# Patient Record
Sex: MALE | Race: WHITE | Employment: FULL TIME | URBAN - METROPOLITAN AREA
[De-identification: names, ages, dates, MRNs, and addresses within clinical notes are randomized per-mention and may not be internally consistent; named-entity substitution may affect disease eponyms.]

---

## 2022-09-28 ENCOUNTER — OFFICE VISIT (OUTPATIENT)
Dept: OCCUPATIONAL MEDICINE | Age: 57
End: 2022-09-28

## 2022-09-28 VITALS
DIASTOLIC BLOOD PRESSURE: 72 MMHG | RESPIRATION RATE: 16 BRPM | HEART RATE: 60 BPM | TEMPERATURE: 98.1 F | SYSTOLIC BLOOD PRESSURE: 138 MMHG | OXYGEN SATURATION: 98 %

## 2022-09-28 DIAGNOSIS — B35.4 TINEA CORPORIS: Primary | ICD-10-CM

## 2022-09-28 RX ORDER — NAFTIFINE HYDROCHLORIDE 20 MG/G
CREAM TOPICAL
Qty: 45 G | Refills: 1 | Status: SHIPPED | OUTPATIENT
Start: 2022-09-28

## 2022-09-28 ASSESSMENT — ENCOUNTER SYMPTOMS
COUGH: 0
RESPIRATORY NEGATIVE: 1
VOMITING: 0
SHORTNESS OF BREATH: 0
SORE THROAT: 0
RHINORRHEA: 0
DIARRHEA: 0

## 2022-09-28 NOTE — PROGRESS NOTES
Subjective:      Patient ID: Syed Vasquez is a 60 y.o. male. Rash  This is a new problem. Episode onset: His wife noticed it 3 days ago. The problem is unchanged. The affected locations include the back. The rash is characterized by dryness and redness. It is unknown (He has dogs at home) if there was an exposure to a precipitant. Pertinent negatives include no congestion, cough, diarrhea, fatigue, fever, joint pain, rhinorrhea, shortness of breath, sore throat or vomiting. Treatments tried: OTC fungal cream for 2 days, once a day. The treatment provided no relief. There is no history of allergies or asthma. Review of Systems   Constitutional:  Negative for chills, fatigue and fever. HENT: Negative. Negative for congestion, rhinorrhea and sore throat. Respiratory: Negative. Negative for cough and shortness of breath. Gastrointestinal:  Negative for diarrhea and vomiting. Genitourinary: Negative. Musculoskeletal:  Negative for joint pain. Skin:  Positive for rash. Red circular spot on back     No Known Allergies   No current outpatient medications on file prior to visit. No current facility-administered medications on file prior to visit. Objective:   Physical Exam  Vitals reviewed. Constitutional:       General: He is not in acute distress. Appearance: Normal appearance. He is not ill-appearing. Skin:     General: Skin is warm and dry. Neurological:      General: No focal deficit present. Mental Status: He is alert and oriented to person, place, and time. Psychiatric:         Mood and Affect: Mood normal.         Behavior: Behavior normal.         Thought Content: Thought content normal.         Judgment: Judgment normal.     Differed complete exam    Vitals:    09/28/22 0829   BP: 138/72   Pulse: 60   Resp: 16   Temp: 98.1 °F (36.7 °C)   SpO2: 98%        Assessment:          Diagnosis Orders   1.  Tinea corporis  Naftifine HCl 2 % CREA Plan:      P:    * Rxs provided: Apply cream daily for up to 2 weeks, once rash is resolved used for an additional 2-3 days as directed, (for up to 2 weeks)- if rash is not resolving after 2 weeks f/u in the clinic or PCP  Recommendations: Keep area clean and dry, warm showers, avoid hot showers, bath, hot tubs until completely resolved. Do not apply any other creams or lotions  Labs performed/ordered:  Educational information:  Educational material reviewed and provided at checkout re:_____  Follow up: Patient was encouraged to return to the clinic and/or PCP if s/s persist or do not resolve completely. Also advised to seek emergent care if worsening signs and symptoms warrant immediate evaluation including, but not limited to HA, blurred vision, speech disturbance, difficulty with ambulation/gait, numbness, tingling, weakness, syncope, abdominal pain, chest pain, or shortness of breath. *Side effects, adverse effects, risks versus benefits associated with medications prescribed/recommended were discussed with the patient. Patient verbalized understanding. All questions answered. * I have reviewed the patient's medication list, past medical, family, social, and surgical    history and updated the patient record appropriately      Social History     Socioeconomic History    Marital status:      Spouse name: Not on file    Number of children: Not on file    Years of education: Not on file    Highest education level: Not on file   Occupational History    Not on file   Tobacco Use    Smoking status: Former     Packs/day: 1.00     Types: Cigarettes     Quit date: 2022     Years since quittin.0    Smokeless tobacco: Never   Substance and Sexual Activity    Alcohol use:  Yes     Alcohol/week: 10.0 standard drinks     Types: 10 Cans of beer per week    Drug use: Not on file    Sexual activity: Not on file   Other Topics Concern    Not on file   Social History Narrative    Not on file     Social Determinants of Health     Financial Resource Strain: Not on file   Food Insecurity: Not on file   Transportation Needs: Not on file   Physical Activity: Not on file   Stress: Not on file   Social Connections: Not on file   Intimate Partner Violence: Not on file   Housing Stability: Not on file     Past Medical History:   Diagnosis Date    History of arthroscopy of right knee      Past Surgical History:   Procedure Laterality Date    HEMORRHOID SURGERY N/A      Family History   Problem Relation Age of Onset    Cancer Maternal Grandfather     Cancer Paternal Grandmother     Cancer Paternal Constanzayetarik Briscoe.  Justin Luna - CNP